# Patient Record
Sex: FEMALE | URBAN - METROPOLITAN AREA
[De-identification: names, ages, dates, MRNs, and addresses within clinical notes are randomized per-mention and may not be internally consistent; named-entity substitution may affect disease eponyms.]

---

## 2022-02-08 ENCOUNTER — HOSPITAL ENCOUNTER (EMERGENCY)
Facility: MEDICAL CENTER | Age: 87
End: 2022-02-08

## 2022-02-08 VITALS
SYSTOLIC BLOOD PRESSURE: 151 MMHG | HEART RATE: 60 BPM | HEIGHT: 61 IN | WEIGHT: 95 LBS | TEMPERATURE: 98.4 F | RESPIRATION RATE: 22 BRPM | DIASTOLIC BLOOD PRESSURE: 87 MMHG | OXYGEN SATURATION: 98 % | BODY MASS INDEX: 17.94 KG/M2

## 2022-02-08 PROCEDURE — 302449 STATCHG TRIAGE ONLY (STATISTIC)

## 2022-02-09 NOTE — ED TRIAGE NOTES
"[facility]  89 y.o.  Chief Complaint   Patient presents with   • Altered Mental Status     Patient ambulatory to ED with shopping cart full of belongings in bags.    Found by staff seated in [facility]. Restless, eyes darting back and forth. Appears to be responding to internal stimuli.    Patient alert, disoriented x 4, answering simple questions only after repeated attempts by this RN. When asked if she needs help patient nods head and states \"yes\" but is unable to verbalize why. States that her name is Ellen Toth and  1964. Unable to find patient with this name/ in the computer system. Patient difficult to understand as she is missing all her teeth.    Patient verbalized consent for belongings search by Security to attempt to find patient identification. Found prescription bottles and called pharmacy to verify.    Patient name: Ellen Lemos  : 1964    Patient states this is correct. Multiple prescription bottles found all with same name on them. Name changed in computer and new wristband placed.    Patient noted with congested cough. States that she has asthma and lung cancer, has done chemo in the past. Noted to be missing almost all of her eyebrows with minimal new hair growth to head. This RN called [facility] and spoke to the Charge RN, states that this is not a familiar patient name to them as an active chemo patient.    Patient noted to be wearing multiple layers of clothing. States that she is homeless and has been living on the streets.    Triage process explained to patient, apologized for wait time, and returned to [facility].  "